# Patient Record
Sex: FEMALE | Race: BLACK OR AFRICAN AMERICAN | NOT HISPANIC OR LATINO | URBAN - METROPOLITAN AREA
[De-identification: names, ages, dates, MRNs, and addresses within clinical notes are randomized per-mention and may not be internally consistent; named-entity substitution may affect disease eponyms.]

---

## 2024-06-05 ENCOUNTER — EMERGENCY (EMERGENCY)
Facility: HOSPITAL | Age: 34
LOS: 1 days | Discharge: HOME | End: 2024-06-05
Admitting: EMERGENCY MEDICINE
Payer: COMMERCIAL

## 2024-06-05 VITALS
OXYGEN SATURATION: 97 % | SYSTOLIC BLOOD PRESSURE: 118 MMHG | TEMPERATURE: 98 F | RESPIRATION RATE: 16 BRPM | DIASTOLIC BLOOD PRESSURE: 80 MMHG | HEART RATE: 70 BPM

## 2024-06-05 VITALS
RESPIRATION RATE: 16 BRPM | HEART RATE: 70 BPM | DIASTOLIC BLOOD PRESSURE: 78 MMHG | OXYGEN SATURATION: 98 % | SYSTOLIC BLOOD PRESSURE: 121 MMHG

## 2024-06-05 DIAGNOSIS — M54.2 CERVICALGIA: ICD-10-CM

## 2024-06-05 LAB — HCG UR QL: NEGATIVE — SIGNIFICANT CHANGE UP

## 2024-06-05 PROCEDURE — 99284 EMERGENCY DEPT VISIT MOD MDM: CPT

## 2024-06-05 PROCEDURE — 72125 CT NECK SPINE W/O DYE: CPT | Mod: 26,MC

## 2024-06-05 RX ORDER — LIDOCAINE 4 G/100G
1 CREAM TOPICAL ONCE
Refills: 0 | Status: COMPLETED | OUTPATIENT
Start: 2024-06-05 | End: 2024-06-05

## 2024-06-05 RX ORDER — CYCLOBENZAPRINE HYDROCHLORIDE 10 MG/1
10 TABLET, FILM COATED ORAL ONCE
Refills: 0 | Status: COMPLETED | OUTPATIENT
Start: 2024-06-05 | End: 2024-06-05

## 2024-06-05 RX ORDER — CYCLOBENZAPRINE HYDROCHLORIDE 10 MG/1
1 TABLET, FILM COATED ORAL
Qty: 15 | Refills: 0
Start: 2024-06-05 | End: 2024-06-09

## 2024-06-05 RX ORDER — KETOROLAC TROMETHAMINE 30 MG/ML
30 SYRINGE (ML) INJECTION ONCE
Refills: 0 | Status: DISCONTINUED | OUTPATIENT
Start: 2024-06-05 | End: 2024-06-05

## 2024-06-05 RX ADMIN — LIDOCAINE 1 PATCH: 4 CREAM TOPICAL at 14:41

## 2024-06-05 RX ADMIN — CYCLOBENZAPRINE HYDROCHLORIDE 10 MILLIGRAM(S): 10 TABLET, FILM COATED ORAL at 14:38

## 2024-06-05 RX ADMIN — Medication 30 MILLIGRAM(S): at 14:38

## 2024-06-05 NOTE — ED ADULT TRIAGE NOTE - [place]OW COMA SCALE: EYE OPENING, MLM
(E4) spontaneous RODRI KUHN ; 03/10/2021 ; [facility]  RODRI KUHN ; 03/10/2021 ; [facility]  [address]

## 2024-06-05 NOTE — ED ADULT NURSE NOTE - OBJECTIVE STATEMENT
Pt presents to ed ambulatory for right neck pain x today that began at work  Pt able to turn head  HAM clear and pt denies any trauma  No deformities observed or injuries. No other complaints  Pt medicated as ordered. All medication education given to patient and pt understands.   Urine sent  plan of care ongoing

## 2024-06-05 NOTE — ED PROVIDER NOTE - CLINICAL SUMMARY MEDICAL DECISION MAKING FREE TEXT BOX
33-year-old female presents this emergency department for right-sided neck pain for 3 hours  On arrival vital signs stable and physical symptoms unremarkable  Cervical spine CT, and medications ordered for likely muscle spasm  Will continue to monitor for

## 2024-06-05 NOTE — ED PROVIDER NOTE - NSFOLLOWUPINSTRUCTIONS_ED_ALL_ED_FT
Overview  A muscle cramp is when a muscle tightens up suddenly. A cramp often happens in the legs. A muscle cramp is also called a muscle spasm or a charley horse.    Muscle cramps usually last less than a minute. However, the pain may last for several minutes. Leg cramps that occur at night may wake you up.    Heavy exercise, dehydration, and being overweight can increase your risk of getting cramps. An imbalance of certain chemicals in your blood, called electrolytes, can also lead to muscle cramps. People who are pregnant sometimes get muscle cramps during sleep.    Muscle cramps can be treated by stretching and massaging the muscle. If cramps keep coming back, your doctor may prescribe medicine that relaxes your muscles.    Follow-up care is a key part of your treatment and safety. Be sure to make and go to all appointments, and call your doctor or nurse advice line (683 in most provinces and [place]) if you are having problems. It's also a good idea to know your test results and keep a list of the medicines you take.    How can you care for yourself at home?  Drink plenty of fluids to prevent dehydration. Choose water and other clear liquids until you feel better. If you have kidney, heart, or liver disease and have to limit fluids, talk with your doctor before you increase the amount of fluids you drink.  Stretch your muscles every day, especially before and after exercise and at bedtime. Regular stretching can relax your muscles and may prevent cramps.  Do not suddenly increase the amount of exercise you get. Increase your exercise a little each week.  When you get a cramp, stretch and massage the muscle. You can also take a warm shower or bath to relax the muscle. A heating pad placed on the muscle can also help.  Ask your doctor if you can take an over-the-counter pain medicine, such as acetaminophen (Tylenol), ibuprofen (Advil, Motrin), or naproxen (Aleve). Be safe with medicines. Read and follow all instructions on the label.  When should you call for help?  	  Watch closely for changes in your health, and be sure to contact your doctor or nurse advice line if:    You get muscle cramps often that do not go away after home treatment.  Your muscle cramps often wake you up at night.  You do not get better as expected.

## 2024-06-05 NOTE — ED PROVIDER NOTE - OBJECTIVE STATEMENT
34 yo F, no pmh, presents to this ED for R-sided neck pain. 34 yo F, no pmh, presents to this ED for R-sided neck pain.Patient states that around 1130 this morning she started having neck pain on the right side, states that she can move her neck around, however it is especially painful when she rotates to the left.  Denies past recent episodes.  Has not tried any medications for symptomatic relief.  States that she was at work with a client when this started.  Denies any trauma.  Denies any past previous similar episodes.  Denies any paresthesias going down the arm, chest pain, shortness of breath, abdominal pain, nausea, vomiting, diarrhea, constipation, fevers, chills.  Denies alcohol, illicit drug use. Denies cp, sob, abd pain, n/v/d, LOC, head strike, aphasia, one-sided weakness, seizures, syncope.

## 2024-06-05 NOTE — ED PROVIDER NOTE - PROGRESS NOTE DETAILS
Pt's pregnancy test is negative  WIll go ahead with imaging and NSAIDs Patient states that she feeling much better, and would like to be discharged  CT is unremarkable  Patient stable for discharge  All results with patient.  Patient understands and agrees with plan.  Agreed to follow-up with primary care doctor in 2 to 3 days.